# Patient Record
(demographics unavailable — no encounter records)

---

## 2025-04-14 NOTE — PHYSICAL EXAM
[de-identified] :  Lumbar Physical Exam   Gait - Normal   Station - Normal   Sagittal balance - Normal   Compensatory mechanism? - None   Heel walk - Normal   Toe walk - Normal   Reflexes Patellar - normal Gastroc - normal Clonus - No   Hip Exam - Normal   Straight leg raise - none   Pulses - 2+ dp/pt   Range of motion - normal   Sensation Sensation intact to light touch in L1, L2, L3, L4, L5 and S1 dermatomes bilaterally   Motor IP Quad HS TA Gastroc EHL Right 5/5 5/5 5/5 5/5 5/5 5/5 Left 5/5 5/5 5/5 5/5 5/5 5/5 [de-identified] : lumbar rads 2 views minimal facet arthropathy disc heights relatively well maintained CSF shunt in abdominal cavity as expected

## 2025-04-14 NOTE — ASSESSMENT
[FreeTextEntry1] :  I had a lengthy discussion with the patient in regards to treatment plan and diagnosis. There are no red flag findings on imaging nor are there any red flag findings on clinical exams.  Therefore we will proceed with a course of conservative treatment. This would include physical therapy/home exercise program, Diclofenac, Tizanidine, Tylenol as medically indicated.  The patient will follow up with me in approximately 4 weeks.  I encouraged the patient to follow-up sooner if there are any new or worsening symptoms.

## 2025-04-14 NOTE — ADDENDUM
[FreeTextEntry1] :  I, Fanta Quiñones, acted solely as a scribe for Dr. Thanh Jackson MD on this date 04/14/2025    All medical record entries made by the Scribe were at my, Dr. Thanh Jackson MD., direction and personally dictated by me on 04/14/2025 . I have reviewed the chart and agree that the record accurately reflects my personal performance of the history, physical exam, assessment and plan. I have also personally directed, reviewed, and agreed with the chart.

## 2025-04-14 NOTE — HISTORY OF PRESENT ILLNESS
[de-identified] : Patient is a 29 year old female who presents today for an initial evaluation of back pain, DOI 03/25/25. She reports that she fell off a chair at work and she had low back pain afterwards. Denies radicular sxs. She states that she would be able to walk five blocks. She drove to the office today.